# Patient Record
Sex: MALE
[De-identification: names, ages, dates, MRNs, and addresses within clinical notes are randomized per-mention and may not be internally consistent; named-entity substitution may affect disease eponyms.]

---

## 2021-05-22 ENCOUNTER — TRANSCRIPTION ENCOUNTER (OUTPATIENT)
Age: 21
End: 2021-05-22

## 2023-01-04 ENCOUNTER — APPOINTMENT (OUTPATIENT)
Dept: FAMILY MEDICINE | Facility: CLINIC | Age: 23
End: 2023-01-04
Payer: COMMERCIAL

## 2023-01-04 VITALS
SYSTOLIC BLOOD PRESSURE: 104 MMHG | TEMPERATURE: 98.1 F | DIASTOLIC BLOOD PRESSURE: 70 MMHG | RESPIRATION RATE: 16 BRPM | BODY MASS INDEX: 24.24 KG/M2 | OXYGEN SATURATION: 99 % | HEIGHT: 72 IN | WEIGHT: 179 LBS | HEART RATE: 84 BPM

## 2023-01-04 DIAGNOSIS — Z80.6 FAMILY HISTORY OF LEUKEMIA: ICD-10-CM

## 2023-01-04 DIAGNOSIS — Z78.9 OTHER SPECIFIED HEALTH STATUS: ICD-10-CM

## 2023-01-04 DIAGNOSIS — R55 SYNCOPE AND COLLAPSE: ICD-10-CM

## 2023-01-04 DIAGNOSIS — Z80.8 FAMILY HISTORY OF MALIGNANT NEOPLASM OF OTHER ORGANS OR SYSTEMS: ICD-10-CM

## 2023-01-04 PROCEDURE — 99385 PREV VISIT NEW AGE 18-39: CPT

## 2023-01-08 PROBLEM — Z78.9 NON-SMOKER: Status: ACTIVE | Noted: 2023-01-08

## 2023-01-08 PROBLEM — R55 VASOVAGAL SYNDROME: Status: ACTIVE | Noted: 2023-01-08

## 2023-01-08 LAB
ALBUMIN SERPL ELPH-MCNC: 4.6 G/DL
ALP BLD-CCNC: 61 U/L
ALT SERPL-CCNC: 31 U/L
ANION GAP SERPL CALC-SCNC: 11 MMOL/L
AST SERPL-CCNC: 27 U/L
BILIRUB DIRECT SERPL-MCNC: 0.2 MG/DL
BILIRUB INDIRECT SERPL-MCNC: 0.6 MG/DL
BILIRUB SERPL-MCNC: 0.8 MG/DL
BUN SERPL-MCNC: 12 MG/DL
CALCIUM SERPL-MCNC: 10.1 MG/DL
CHLORIDE SERPL-SCNC: 103 MMOL/L
CHOLEST SERPL-MCNC: 134 MG/DL
CO2 SERPL-SCNC: 25 MMOL/L
CREAT SERPL-MCNC: 1.06 MG/DL
EGFR: 102 ML/MIN/1.73M2
GLUCOSE SERPL-MCNC: 95 MG/DL
HBV SURFACE AB SERPL IA-ACNC: 218.4 MIU/ML
HDLC SERPL-MCNC: 70 MG/DL
LDLC SERPL CALC-MCNC: 51 MG/DL
MEV IGG FLD QL IA: 270 AU/ML
MEV IGG+IGM SER-IMP: POSITIVE
MUV AB SER-ACNC: NEGATIVE
MUV IGG SER QL IA: 5.1 AU/ML
NONHDLC SERPL-MCNC: 64 MG/DL
POTASSIUM SERPL-SCNC: 4.5 MMOL/L
PROT SERPL-MCNC: 7.2 G/DL
RUBV IGG FLD-ACNC: 1.5 INDEX
RUBV IGG SER-IMP: POSITIVE
SODIUM SERPL-SCNC: 139 MMOL/L
T4 FREE SERPL-MCNC: 1.4 NG/DL
TRIGL SERPL-MCNC: 62 MG/DL
TSH SERPL-ACNC: 1.94 UIU/ML
VZV AB TITR SER: POSITIVE
VZV IGG SER IF-ACNC: 207.1 INDEX

## 2023-01-08 NOTE — ASSESSMENT
[FreeTextEntry1] : Routine medical examination\par VSS- exam normal \par next week for ppd\par Advised healthy diet and exercise\par will follow up labs \par patient verbalizes understanding and patient is stable upon discharge\par

## 2023-01-08 NOTE — HEALTH RISK ASSESSMENT
[Excellent] : ~his/her~  mood as  excellent [Never] : Never [Yes] : Yes [Monthly or less (1 pt)] : Monthly or less (1 point) [1 or 2 (0 pts)] : 1 or 2 (0 points) [Never (0 pts)] : Never (0 points) [No] : In the past 12 months have you used drugs other than those required for medical reasons? No [0] : 2) Feeling down, depressed, or hopeless: Not at all (0) [PHQ-2 Negative - No further assessment needed] : PHQ-2 Negative - No further assessment needed [HIV test declined] : HIV test declined [Hepatitis C test declined] : Hepatitis C test declined [With Family] : lives with family [Student] : student [Single] : single [Sexually Active] : sexually active [Feels Safe at Home] : Feels safe at home [Fully functional (bathing, dressing, toileting, transferring, walking, feeding)] : Fully functional (bathing, dressing, toileting, transferring, walking, feeding) [Fully functional (using the telephone, shopping, preparing meals, housekeeping, doing laundry, using] : Fully functional and needs no help or supervision to perform IADLs (using the telephone, shopping, preparing meals, housekeeping, doing laundry, using transportation, managing medications and managing finances) [None] : None [FreeTextEntry1] : none  [de-identified] : none [de-identified] : none  [Audit-CScore] : 1 [de-identified] : works out daily  [de-identified] : balanced; supplements- creatine daily  [LVK7Plekg] : 0 [Change in mental status noted] : No change in mental status noted [Language] : denies difficulty with language [High Risk Behavior] : no high risk behavior [Reports changes in hearing] : Reports no changes in hearing [Reports changes in vision] : Reports no changes in vision [Reports changes in dental health] : Reports no changes in dental health [de-identified] : parents and sisters  [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

## 2023-01-08 NOTE — HISTORY OF PRESENT ILLNESS
[FreeTextEntry1] : CPE [de-identified] : overall is feeling well\par denies any other complaints or concerns at this time

## 2023-01-10 ENCOUNTER — NON-APPOINTMENT (OUTPATIENT)
Age: 23
End: 2023-01-10

## 2023-01-16 ENCOUNTER — APPOINTMENT (OUTPATIENT)
Dept: FAMILY MEDICINE | Facility: CLINIC | Age: 23
End: 2023-01-16
Payer: COMMERCIAL

## 2023-01-16 DIAGNOSIS — Z11.1 ENCOUNTER FOR SCREENING FOR RESPIRATORY TUBERCULOSIS: ICD-10-CM

## 2023-01-16 PROCEDURE — 86580 TB INTRADERMAL TEST: CPT

## 2023-07-10 ENCOUNTER — NON-APPOINTMENT (OUTPATIENT)
Age: 23
End: 2023-07-10

## 2024-03-29 ENCOUNTER — APPOINTMENT (OUTPATIENT)
Dept: FAMILY MEDICINE | Facility: CLINIC | Age: 24
End: 2024-03-29
Payer: COMMERCIAL

## 2024-03-29 VITALS
BODY MASS INDEX: 25.06 KG/M2 | SYSTOLIC BLOOD PRESSURE: 118 MMHG | WEIGHT: 185 LBS | TEMPERATURE: 98.4 F | DIASTOLIC BLOOD PRESSURE: 70 MMHG | RESPIRATION RATE: 16 BRPM | HEART RATE: 86 BPM | HEIGHT: 72 IN | OXYGEN SATURATION: 98 %

## 2024-03-29 DIAGNOSIS — Z00.00 ENCOUNTER FOR GENERAL ADULT MEDICAL EXAMINATION W/OUT ABNORMAL FINDINGS: ICD-10-CM

## 2024-03-29 DIAGNOSIS — E55.9 VITAMIN D DEFICIENCY, UNSPECIFIED: ICD-10-CM

## 2024-03-29 PROCEDURE — 99395 PREV VISIT EST AGE 18-39: CPT

## 2024-03-29 NOTE — PHYSICAL EXAM
[Normal Supraclavicular Nodes] : no supraclavicular lymphadenopathy [No Rash] : no rash [Coordination Grossly Intact] : coordination grossly intact [No Focal Deficits] : no focal deficits [Normal Gait] : normal gait [Speech Grossly Normal] : speech grossly normal [Alert and Oriented x3] : oriented to person, place, and time [Normal Mood] : the mood was normal [Normal] : affect was normal and insight and judgment were intact

## 2024-03-29 NOTE — HISTORY OF PRESENT ILLNESS
[FreeTextEntry1] : 24 yo male presents to the office for a physical.  [de-identified] : the patient reports feeling well, states recently taking civil service test, was placed in lottery for [employer]. 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

## 2024-03-29 NOTE — HEALTH RISK ASSESSMENT
[Very Good] : ~his/her~  mood as very good [No] : In the past 12 months have you used drugs other than those required for medical reasons? No [No falls in past year] : Patient reported no falls in the past year [0] : 2) Feeling down, depressed, or hopeless: Not at all (0) [PHQ-2 Negative - No further assessment needed] : PHQ-2 Negative - No further assessment needed [With Family] : lives with family [# of Members in Household ___] :  household currently consist of [unfilled] member(s) [Sexually Active] : sexually active [Feels Safe at Home] : Feels safe at home [Fully functional (bathing, dressing, toileting, transferring, walking, feeding)] : Fully functional (bathing, dressing, toileting, transferring, walking, feeding) [Fully functional (using the telephone, shopping, preparing meals, housekeeping, doing laundry, using] : Fully functional and needs no help or supervision to perform IADLs (using the telephone, shopping, preparing meals, housekeeping, doing laundry, using transportation, managing medications and managing finances) [Reports normal functional visual acuity (ie: able to read med bottle)] : Reports normal functional visual acuity [College] : College [Single] : single [Never] : Never [de-identified] : healthy [Audit-CScore] : 0 [de-identified] : active [[id number]] : 0 [High Risk Behavior] : no high risk behavior [Reports changes in hearing] : Reports no changes in hearing [Reports changes in vision] : Reports no changes in vision

## 2024-10-02 ENCOUNTER — EMERGENCY (EMERGENCY)
Facility: HOSPITAL | Age: 24
LOS: 1 days | Discharge: HOME | End: 2024-10-02
Attending: STUDENT IN AN ORGANIZED HEALTH CARE EDUCATION/TRAINING PROGRAM | Admitting: STUDENT IN AN ORGANIZED HEALTH CARE EDUCATION/TRAINING PROGRAM
Payer: COMMERCIAL

## 2024-10-02 VITALS
WEIGHT: 184.97 LBS | DIASTOLIC BLOOD PRESSURE: 76 MMHG | TEMPERATURE: 99 F | HEART RATE: 86 BPM | RESPIRATION RATE: 16 BRPM | HEIGHT: 73 IN | SYSTOLIC BLOOD PRESSURE: 131 MMHG | OXYGEN SATURATION: 98 %

## 2024-10-02 VITALS
SYSTOLIC BLOOD PRESSURE: 110 MMHG | RESPIRATION RATE: 18 BRPM | DIASTOLIC BLOOD PRESSURE: 71 MMHG | TEMPERATURE: 98 F | HEART RATE: 97 BPM | OXYGEN SATURATION: 99 %

## 2024-10-02 PROCEDURE — 73562 X-RAY EXAM OF KNEE 3: CPT

## 2024-10-02 PROCEDURE — 73562 X-RAY EXAM OF KNEE 3: CPT | Mod: 26,LT

## 2024-10-02 PROCEDURE — 99284 EMERGENCY DEPT VISIT MOD MDM: CPT

## 2024-10-02 PROCEDURE — 99283 EMERGENCY DEPT VISIT LOW MDM: CPT | Mod: 25

## 2024-10-02 NOTE — ED PROVIDER NOTE - OBJECTIVE STATEMENT
23-year-old male with no significant PMH presents with left knee pain status post injury while playing football.  Patient states he was playing flag football earlier today and he collided with another player.  His left knee collided with another player's knee.  He felt pain immediate pain and was concerned that his knee "popped out."  He has been ambulatory since the incident.  At home he took 400 mg of Motrin, applied ice as well as a knee brace and has been ambulating with crutches that he had in his house.  He states currently his pain has significantly improved, rates it at a 1/10.  He denies any other injuries. PMD Carolann Starr

## 2024-10-02 NOTE — ED PROVIDER NOTE - CLINICAL SUMMARY MEDICAL DECISION MAKING FREE TEXT BOX
23 year old male p/w left knee pain that occurred while playing football and colliding with another player.  No LOC.  He applied ice and took 400mg Motrin PTA, reports pain has improved, he has been ambulatory and using crutches from home,  X-ray, knee immobilizer as needed, ortho follow up

## 2024-10-02 NOTE — ED PROVIDER NOTE - CARE PROVIDER_API CALL
Malvin Liu  Orthopaedic Surgery  [address]  [place] [address]  Phone: [phone]  Fax: [phone]  Follow Up Time:     Kerline Starr  NP in Primary Care Adult-Gerontology  [address], [address]  [place] [address]  Phone: [phone]  Fax: [phone]  Follow Up Time:

## 2024-10-02 NOTE — ED PROVIDER NOTE - CARE PROVIDERS DIRECT ADDRESSES
[email or web address]

## 2024-10-02 NOTE — ED PROVIDER NOTE - MUSCULOSKELETAL, MLM
Spine appears normal, range of motion is not limited, no muscle or joint tenderness.  Left knee with no deformity, ecchymosis, edema, or effusion,  Full ROM, mild TTP at patella.  No laceration or abrasion, no erythema, +LE pulses, cap refill < 2 seconds

## 2024-10-02 NOTE — ED ADULT NURSE NOTE - OBJECTIVE STATEMENT
Patient complaining of L knee injury. States was playing flag football and collided with another player. States he heard his knee "pop."

## 2024-10-02 NOTE — ED PROVIDER NOTE - DIFFERENTIAL DIAGNOSIS
Ddx includes but not limited to fracture, dislocation, sprain, contusion, tendon/ligament injury Differential Diagnosis

## 2024-10-02 NOTE — ED PROVIDER NOTE - PATIENT PORTAL LINK FT
You can access the FollowMyHealth Patient Portal offered by [facility] by registering at the following website: [email or web address]. By joining [facility]’s FollowMyHealth portal, you will also be able to view your health information using other applications (apps) compatible with our system.

## 2024-10-02 NOTE — ED PROVIDER NOTE - NSFOLLOWUPINSTRUCTIONS_ED_ALL_ED_FT
Please take Motrin or Tylenol for pain, apply ice to your knee, use a compression wrap, and follow-up with orthopedics.  You can do weightbearing as tolerated.  Return to the emergency room for persistent pain, swelling, edema, leg numbness, fever, or any other concerns.    Knee Sprain, Adult       A knee sprain is a stretch or tear in a knee ligament. Knee ligaments are tissues that connect bones in the knee to each other.    What are the causes?  This condition often results from:    A fall.  An injury to the knee.    What are the signs or symptoms?  Symptoms of this condition include:    Trouble straightening or bending the leg.  Swelling in the knee.  Bruising around the knee.  Tenderness or pain in the knee.  Muscle spasms around the knee.    How is this diagnosed?  This condition may be diagnosed based on:    A physical exam.  A history of what happened just before you started to have symptoms.  Tests, including:    An X-ray. This may be done to make sure no bones are broken.  An MRI. This may be done to check if the ligament is torn.  Stress testing of the knee. This may be done to check ligament damage.    How is this treated?  Treatment for this condition may involve:    Keeping the knee still (immobilized) with a cast, brace, or splint.  Applying ice to the knee. This helps with pain and swelling.  Raising (elevating) the knee above the level of your heart when you are resting. This helps with pain and swelling.  Taking medicine for pain.  Doing exercises to prevent or limit permanent weakness or stiffness in your knee.  Having surgery to reconnect the ligament to the bone or to reconstruct it. This may be needed if the ligament is completely torn.    Follow these instructions at home:      If you have a splint or brace:    Wear it as told by your health care provider. Remove it only as told by your health care provider.  Check the skin around it every day. Tell your health care provider about any concerns.  Loosen it if your toes tingle, become numb, or turn cold and blue.  Keep it clean and dry.        If you have a cast:    Do not stick anything inside it to scratch your skin. Doing that increases your risk of infection.  Check the skin around it every day. Tell your health care provider about any concerns.  You may put lotion on dry skin around the edges of the cast. Do not put lotion on the skin underneath the cast.  Keep it clean and dry.        Bathing    If you have a splint, brace, or cast that is not waterproof:    Do not let it get wet.  Cover it with a watertight covering when you take a bath or a shower.        Managing pain, stiffness, and swelling     If directed, put ice on the injured area. To do this:    If you have a removable splint or brace, remove it as told by your health care provider.  Put ice in a plastic bag.  Place a towel between your skin and the bag or between your cast and the bag.  Leave the ice on for 20 minutes, 2–3 times a day.  Move your toes often to reduce stiffness and swelling.  Elevate the injured area above the level of your heart while you are sitting or lying down.        General instructions    Take over-the-counter and prescription medicines only as told by your health care provider.  Do not use any products that contain nicotine or tobacco, such as cigarettes, e-cigarettes, and chewing tobacco. These can delay healing. If you need help quitting, ask your health care provider.  Do exercises as told by your health care provider.  Keep all follow-up visits as told by your health care provider. This is important.    Contact a health care provider if:  You have pain that gets worse.  The cast, brace, or splint does not fit right.  The cast, brace, or splint gets damaged.    Get help right away if:  You cannot use your injured knee to support any of your body weight (cannot bear weight).  You cannot move the injured joint.  You cannot walk more than a few steps without pain or without your knee buckling.  You have significant pain, swelling, or numbness in the leg below the cast, brace, or splint.  Your foot or toes are numb, cold, or blue after loosening your splint or brace.    Summary  A knee sprain is a stretch or tear in a knee ligament that usually occurs as the result of a fall or injury.  Treatment may involve immobilizing the knee with a cast, splint, or brace and then doing exercises.  If the ligament is completely torn, it may require surgery to repair or replace the injured ligament.    ADDITIONAL NOTES AND INSTRUCTIONS    Please follow up with your Primary MD in 24-48 hr.  Seek immediate medical care for any new/worsening signs or symptoms.

## 2024-10-02 NOTE — ED ADULT NURSE NOTE - NSFALLRISKINTERV_ED_ALL_ED

## 2024-10-02 NOTE — ED ADULT TRIAGE NOTE - CHIEF COMPLAINT QUOTE
Came in reports he was playing football tonight and he bump his knee to another persons knee now c/o pain in the left knee. Patient on left knee support and a crutches.

## 2025-08-28 ENCOUNTER — APPOINTMENT (OUTPATIENT)
Dept: FAMILY MEDICINE | Facility: CLINIC | Age: 25
End: 2025-08-28

## 2025-08-28 VITALS
TEMPERATURE: 97 F | HEIGHT: 72 IN | RESPIRATION RATE: 16 BRPM | WEIGHT: 193 LBS | BODY MASS INDEX: 26.14 KG/M2 | OXYGEN SATURATION: 99 % | SYSTOLIC BLOOD PRESSURE: 120 MMHG | DIASTOLIC BLOOD PRESSURE: 70 MMHG | HEART RATE: 74 BPM

## 2025-08-28 DIAGNOSIS — R55 SYNCOPE AND COLLAPSE: ICD-10-CM

## 2025-08-28 DIAGNOSIS — F43.0 ACUTE STRESS REACTION: ICD-10-CM

## 2025-08-28 PROCEDURE — 99213 OFFICE O/P EST LOW 20 MIN: CPT

## 2025-08-28 PROCEDURE — G2211 COMPLEX E/M VISIT ADD ON: CPT | Mod: NC
